# Patient Record
Sex: MALE | Race: BLACK OR AFRICAN AMERICAN | NOT HISPANIC OR LATINO | Employment: PART TIME | URBAN - METROPOLITAN AREA
[De-identification: names, ages, dates, MRNs, and addresses within clinical notes are randomized per-mention and may not be internally consistent; named-entity substitution may affect disease eponyms.]

---

## 2019-10-03 ENCOUNTER — HOSPITAL ENCOUNTER (EMERGENCY)
Facility: OTHER | Age: 44
Discharge: HOME | End: 2019-10-03
Attending: EMERGENCY MEDICINE
Payer: MEDICAID

## 2019-10-03 VITALS
TEMPERATURE: 98 F | HEIGHT: 72 IN | HEART RATE: 98 BPM | RESPIRATION RATE: 16 BRPM | OXYGEN SATURATION: 98 % | SYSTOLIC BLOOD PRESSURE: 137 MMHG | BODY MASS INDEX: 37.93 KG/M2 | WEIGHT: 280 LBS | DIASTOLIC BLOOD PRESSURE: 84 MMHG

## 2019-10-03 DIAGNOSIS — B37.49 CANDIDAL BALANOPOSTHITIS: Primary | ICD-10-CM

## 2019-10-03 LAB
BACTERIA #/AREA URNS HPF: ABNORMAL /HPF
BILIRUB UR QL STRIP: NEGATIVE
C TRACH DNA SPEC QL NAA+PROBE: NOT DETECTED
CLARITY UR: CLEAR
COLOR UR: YELLOW
GLUCOSE SERPL-MCNC: 364 MG/DL (ref 70–110)
GLUCOSE UR QL STRIP: ABNORMAL
HGB UR QL STRIP: ABNORMAL
KETONES UR QL STRIP: ABNORMAL
LEUKOCYTE ESTERASE UR QL STRIP: NEGATIVE
MICROSCOPIC COMMENT: ABNORMAL
N GONORRHOEA DNA SPEC QL NAA+PROBE: NOT DETECTED
NITRITE UR QL STRIP: NEGATIVE
PH UR STRIP: 6 [PH] (ref 5–8)
POCT GLUCOSE: 364 MG/DL (ref 70–110)
PROT UR QL STRIP: NEGATIVE
RBC #/AREA URNS HPF: 5 /HPF (ref 0–4)
SP GR UR STRIP: 1.01 (ref 1–1.03)
SQUAMOUS #/AREA URNS HPF: 6 /HPF
URN SPEC COLLECT METH UR: ABNORMAL
UROBILINOGEN UR STRIP-ACNC: NEGATIVE EU/DL
WBC #/AREA URNS HPF: 5 /HPF (ref 0–5)
WBC CLUMPS URNS QL MICRO: ABNORMAL
YEAST URNS QL MICRO: ABNORMAL

## 2019-10-03 PROCEDURE — 81000 URINALYSIS NONAUTO W/SCOPE: CPT

## 2019-10-03 PROCEDURE — 82962 GLUCOSE BLOOD TEST: CPT

## 2019-10-03 PROCEDURE — 25000003 PHARM REV CODE 250: Performed by: EMERGENCY MEDICINE

## 2019-10-03 PROCEDURE — 99283 EMERGENCY DEPT VISIT LOW MDM: CPT

## 2019-10-03 PROCEDURE — 87491 CHLMYD TRACH DNA AMP PROBE: CPT

## 2019-10-03 RX ORDER — ATORVASTATIN CALCIUM 40 MG/1
40 TABLET, FILM COATED ORAL NIGHTLY
COMMUNITY

## 2019-10-03 RX ORDER — METFORMIN HYDROCHLORIDE 500 MG/1
500 TABLET ORAL 2 TIMES DAILY WITH MEALS
COMMUNITY
End: 2019-10-03 | Stop reason: SDUPTHER

## 2019-10-03 RX ORDER — AMLODIPINE BESYLATE 10 MG/1
10 TABLET ORAL DAILY
COMMUNITY

## 2019-10-03 RX ORDER — CLOTRIMAZOLE 1 %
CREAM (GRAM) TOPICAL
Qty: 15 G | Refills: 0 | Status: SHIPPED | OUTPATIENT
Start: 2019-10-03

## 2019-10-03 RX ORDER — FLUCONAZOLE 100 MG/1
200 TABLET ORAL
Status: COMPLETED | OUTPATIENT
Start: 2019-10-03 | End: 2019-10-03

## 2019-10-03 RX ORDER — LOSARTAN POTASSIUM 100 MG/1
100 TABLET ORAL DAILY
COMMUNITY
End: 2020-08-13

## 2019-10-03 RX ORDER — METFORMIN HYDROCHLORIDE 500 MG/1
TABLET ORAL
Qty: 45 TABLET | Refills: 0 | Status: SHIPPED | OUTPATIENT
Start: 2019-10-03

## 2019-10-03 RX ADMIN — FLUCONAZOLE 200 MG: 100 TABLET ORAL at 05:10

## 2019-10-03 NOTE — ED TRIAGE NOTES
Pt to ED with CO pain, swelling, and white drainage from his foreskin. Pt states symptoms began 4 weeks ago, and have worsened over the past few day. Pt denies polyuria, and dysuria.

## 2019-10-03 NOTE — ED PROVIDER NOTES
Encounter Date: 10/3/2019    SCRIBE #1 NOTE: Praveen MARIE am scribing for, and in the presence of, Dr. Downing .       History     Chief Complaint   Patient presents with    Penis Pain     Pt CO pain, and irritation to his penis Xs 4 week.      Time seen by provider: 4:47 AM    This is a 44 y.o. male who presents with complaint of penile pain and skin irritation which has been progressively worsening for about 4 weeks.  The patient reports he was recently diagnosed with diabetes approximately 2 months ago at which time he had polyuria and polydipsia.  He was initiated on metformin regimen which he has been compliant with.  He is on 500 mg twice a day.  He has received a prescription for a glucometer but has not picked it up from the pharmacy as of yet.  The last time he checked his blood sugar was in the 200s.  The patient reports he has been compliant with diet and generally feels better since starting metformin.  He is sexually active but has not had sex in several weeks, due the symptoms.  No scrotal pain or dysuria.       The history is provided by the patient.     Review of patient's allergies indicates:  No Known Allergies  Past Medical History:   Diagnosis Date    Diabetes mellitus      History reviewed. No pertinent surgical history.  History reviewed. No pertinent family history.  Social History     Tobacco Use    Smoking status: Not on file   Substance Use Topics    Alcohol use: Not on file    Drug use: Not on file     Review of Systems   Constitutional: Negative for fever.   HENT: Negative for sore throat.    Respiratory: Negative for shortness of breath.    Cardiovascular: Negative for chest pain.   Gastrointestinal: Negative for nausea.   Endocrine: Negative for polydipsia, polyphagia and polyuria.   Genitourinary: Positive for discharge, penile pain and penile swelling. Negative for decreased urine volume, dysuria, scrotal swelling, testicular pain and urgency.   Musculoskeletal: Negative for  back pain.   Skin: Positive for rash.   Neurological: Negative for weakness.   Hematological: Does not bruise/bleed easily.   All other systems reviewed and are negative.      Physical Exam     Initial Vitals [10/03/19 0421]   BP Pulse Resp Temp SpO2   137/84 98 16 98.1 °F (36.7 °C) 98 %      MAP       --         Physical Exam    Nursing note and vitals reviewed.  Constitutional: He appears well-developed and well-nourished.   HENT:   Head: Normocephalic and atraumatic.   Eyes: Conjunctivae and EOM are normal. Pupils are equal, round, and reactive to light.   Neck: Normal range of motion. Neck supple.   Cardiovascular: Normal rate, regular rhythm, normal heart sounds and intact distal pulses. Exam reveals no gallop and no friction rub.    No murmur heard.  Pulmonary/Chest: Breath sounds normal. No stridor. No respiratory distress. He has no wheezes. He has no rhonchi. He has no rales. He exhibits no tenderness.   Abdominal: Soft. Bowel sounds are normal. He exhibits no distension. There is no tenderness. There is no rebound and no guarding.   Musculoskeletal: Normal range of motion.   Neurological: He is alert and oriented to person, place, and time. He has normal strength. No cranial nerve deficit. GCS score is 15. GCS eye subscore is 4. GCS verbal subscore is 5. GCS motor subscore is 6.   Skin: Skin is warm and dry. Capillary refill takes less than 2 seconds.   Psychiatric: He has a normal mood and affect.         ED Course   Procedures  Labs Reviewed   URINALYSIS, REFLEX TO URINE CULTURE - Abnormal; Notable for the following components:       Result Value    Glucose, UA 3+ (*)     Ketones, UA 1+ (*)     Occult Blood UA 1+ (*)     All other components within normal limits    Narrative:     Preferred Collection Type->Urine, Clean Catch   URINALYSIS MICROSCOPIC - Abnormal; Notable for the following components:    RBC, UA 5 (*)     All other components within normal limits    Narrative:     Preferred Collection  Type->Urine, Clean Catch   POCT GLUCOSE MONITORING CONTINUOUS - Abnormal; Notable for the following components:    POC Glucose 364 (*)     All other components within normal limits   POCT GLUCOSE - Abnormal; Notable for the following components:    POCT Glucose 364 (*)     All other components within normal limits   C. TRACHOMATIS/N. GONORRHOEAE BY AMP DNA    Narrative:     Sources by Resulting Lab:->[facility]          Imaging Results    None          Medical Decision Making:   History:   Old Medical Records: I decided to obtain old medical records.  Initial Assessment:   44-year-old male with apparent yeast balanitis and infection of foreskin.  Poorly controlled DM on low dose metformin regimen.  Plan tx with diflucan, nystatin   Clinical Tests:   Lab Tests: Ordered and Reviewed            Scribe Attestation:   Scribe #1: I performed the above scribed service and the documentation accurately describes the services I performed. I attest to the accuracy of the note.    Attending Attestation:           Physician Attestation for Scribe:  Physician Attestation Statement for Scribe #1: I, Dr. Downing , reviewed documentation, as scribed by Praveen Qiu in my presence, and it is both accurate and complete.                    Clinical Impression:     1. Candidal balanoposthitis                                 Kalyn Downing MD  10/07/19 3565

## 2019-10-08 ENCOUNTER — HOSPITAL ENCOUNTER (EMERGENCY)
Facility: OTHER | Age: 44
Discharge: HOME | End: 2019-10-08
Attending: EMERGENCY MEDICINE
Payer: MEDICAID

## 2019-10-08 VITALS
OXYGEN SATURATION: 98 % | HEIGHT: 72 IN | RESPIRATION RATE: 14 BRPM | WEIGHT: 275 LBS | HEART RATE: 74 BPM | BODY MASS INDEX: 37.25 KG/M2 | DIASTOLIC BLOOD PRESSURE: 67 MMHG | SYSTOLIC BLOOD PRESSURE: 117 MMHG | TEMPERATURE: 98 F

## 2019-10-08 DIAGNOSIS — R73.9 HYPERGLYCEMIA: Primary | ICD-10-CM

## 2019-10-08 DIAGNOSIS — B37.42 CANDIDAL BALANITIS: ICD-10-CM

## 2019-10-08 DIAGNOSIS — R21 RASH AND NONSPECIFIC SKIN ERUPTION: ICD-10-CM

## 2019-10-08 LAB
ALBUMIN SERPL BCP-MCNC: 4 G/DL (ref 3.5–5.2)
ALLENS TEST: ABNORMAL
ALP SERPL-CCNC: 91 U/L (ref 55–135)
ALT SERPL W/O P-5'-P-CCNC: 31 U/L (ref 10–44)
ANION GAP SERPL CALC-SCNC: 8 MMOL/L (ref 8–16)
AST SERPL-CCNC: 17 U/L (ref 10–40)
B-OH-BUTYR BLD STRIP-SCNC: 0.1 MMOL/L (ref 0–0.5)
BACTERIA #/AREA URNS HPF: ABNORMAL /HPF
BASOPHILS # BLD AUTO: 0.04 K/UL (ref 0–0.2)
BASOPHILS NFR BLD: 0.5 % (ref 0–1.9)
BILIRUB SERPL-MCNC: 0.5 MG/DL (ref 0.1–1)
BILIRUB UR QL STRIP: NEGATIVE
BUN SERPL-MCNC: 15 MG/DL (ref 6–20)
CALCIUM SERPL-MCNC: 9.6 MG/DL (ref 8.7–10.5)
CHLORIDE SERPL-SCNC: 103 MMOL/L (ref 95–110)
CLARITY UR: CLEAR
CO2 SERPL-SCNC: 27 MMOL/L (ref 23–29)
COLOR UR: YELLOW
CREAT SERPL-MCNC: 1.4 MG/DL (ref 0.5–1.4)
DELSYS: ABNORMAL
DIFFERENTIAL METHOD: ABNORMAL
EOSINOPHIL # BLD AUTO: 0.6 K/UL (ref 0–0.5)
EOSINOPHIL NFR BLD: 7.9 % (ref 0–8)
ERYTHROCYTE [DISTWIDTH] IN BLOOD BY AUTOMATED COUNT: 12.5 % (ref 11.5–14.5)
ERYTHROCYTE [SEDIMENTATION RATE] IN BLOOD BY WESTERGREN METHOD: 20 MM/H
EST. GFR  (AFRICAN AMERICAN): >60 ML/MIN/1.73 M^2
EST. GFR  (NON AFRICAN AMERICAN): 60 ML/MIN/1.73 M^2
FIO2: 21
FLOW: 0
GLUCOSE SERPL-MCNC: 356 MG/DL (ref 70–110)
GLUCOSE UR QL STRIP: ABNORMAL
HCO3 UR-SCNC: 26 MMOL/L (ref 24–28)
HCT VFR BLD AUTO: 42 % (ref 40–54)
HGB BLD-MCNC: 14.2 G/DL (ref 14–18)
HGB UR QL STRIP: ABNORMAL
IMM GRANULOCYTES # BLD AUTO: 0.02 K/UL (ref 0–0.04)
IMM GRANULOCYTES NFR BLD AUTO: 0.2 % (ref 0–0.5)
KETONES UR QL STRIP: NEGATIVE
LEUKOCYTE ESTERASE UR QL STRIP: NEGATIVE
LYMPHOCYTES # BLD AUTO: 3 K/UL (ref 1–4.8)
LYMPHOCYTES NFR BLD: 37.8 % (ref 18–48)
MCH RBC QN AUTO: 28 PG (ref 27–31)
MCHC RBC AUTO-ENTMCNC: 33.8 G/DL (ref 32–36)
MCV RBC AUTO: 83 FL (ref 82–98)
MICROSCOPIC COMMENT: ABNORMAL
MODE: ABNORMAL
MONOCYTES # BLD AUTO: 0.9 K/UL (ref 0.3–1)
MONOCYTES NFR BLD: 11.6 % (ref 4–15)
NEUTROPHILS # BLD AUTO: 3.4 K/UL (ref 1.8–7.7)
NEUTROPHILS NFR BLD: 42 % (ref 38–73)
NITRITE UR QL STRIP: NEGATIVE
NRBC BLD-RTO: 0 /100 WBC
PCO2 BLDA: 47 MMHG (ref 35–45)
PH SMN: 7.35 [PH] (ref 7.35–7.45)
PH UR STRIP: 6 [PH] (ref 5–8)
PLATELET # BLD AUTO: 226 K/UL (ref 150–350)
PMV BLD AUTO: 11.1 FL (ref 9.2–12.9)
PO2 BLDA: 34 MMHG (ref 40–60)
POC BE: 0 MMOL/L
POC SATURATED O2: 62 % (ref 95–100)
POCT GLUCOSE: 300 MG/DL (ref 70–110)
POCT GLUCOSE: 304 MG/DL (ref 70–110)
POTASSIUM SERPL-SCNC: 4 MMOL/L (ref 3.5–5.1)
PROT SERPL-MCNC: 7.2 G/DL (ref 6–8.4)
PROT UR QL STRIP: NEGATIVE
RBC # BLD AUTO: 5.08 M/UL (ref 4.6–6.2)
RBC #/AREA URNS HPF: 0 /HPF (ref 0–4)
SAMPLE: ABNORMAL
SITE: ABNORMAL
SODIUM SERPL-SCNC: 138 MMOL/L (ref 136–145)
SP GR UR STRIP: 1.02 (ref 1–1.03)
SP02: 98
SQUAMOUS #/AREA URNS HPF: 3 /HPF
URN SPEC COLLECT METH UR: ABNORMAL
UROBILINOGEN UR STRIP-ACNC: NEGATIVE EU/DL
WBC # BLD AUTO: 8.01 K/UL (ref 3.9–12.7)
WBC #/AREA URNS HPF: 3 /HPF (ref 0–5)
YEAST URNS QL MICRO: ABNORMAL

## 2019-10-08 PROCEDURE — 81000 URINALYSIS NONAUTO W/SCOPE: CPT

## 2019-10-08 PROCEDURE — 25000003 PHARM REV CODE 250: Performed by: EMERGENCY MEDICINE

## 2019-10-08 PROCEDURE — 82803 BLOOD GASES ANY COMBINATION: CPT

## 2019-10-08 PROCEDURE — 80053 COMPREHEN METABOLIC PANEL: CPT

## 2019-10-08 PROCEDURE — 85025 COMPLETE CBC W/AUTO DIFF WBC: CPT

## 2019-10-08 PROCEDURE — 63600175 PHARM REV CODE 636 W HCPCS: Performed by: EMERGENCY MEDICINE

## 2019-10-08 PROCEDURE — 96360 HYDRATION IV INFUSION INIT: CPT

## 2019-10-08 PROCEDURE — 99900035 HC TECH TIME PER 15 MIN (STAT)

## 2019-10-08 PROCEDURE — 99284 EMERGENCY DEPT VISIT MOD MDM: CPT | Mod: 25

## 2019-10-08 PROCEDURE — 82010 KETONE BODYS QUAN: CPT

## 2019-10-08 PROCEDURE — 36415 COLL VENOUS BLD VENIPUNCTURE: CPT

## 2019-10-08 RX ORDER — DIPHENHYDRAMINE HCL 25 MG
25 CAPSULE ORAL
Status: COMPLETED | OUTPATIENT
Start: 2019-10-08 | End: 2019-10-08

## 2019-10-08 RX ORDER — HYDROCORTISONE 1 %
CREAM (GRAM) TOPICAL
Qty: 30 G | Refills: 0 | Status: SHIPPED | OUTPATIENT
Start: 2019-10-08

## 2019-10-08 RX ORDER — FLUCONAZOLE 100 MG/1
200 TABLET ORAL
Status: COMPLETED | OUTPATIENT
Start: 2019-10-08 | End: 2019-10-08

## 2019-10-08 RX ADMIN — DIPHENHYDRAMINE HYDROCHLORIDE 25 MG: 25 CAPSULE ORAL at 05:10

## 2019-10-08 RX ADMIN — FLUCONAZOLE 200 MG: 100 TABLET ORAL at 05:10

## 2019-10-08 RX ADMIN — SODIUM CHLORIDE 1000 ML: 0.9 INJECTION, SOLUTION INTRAVENOUS at 05:10

## 2019-10-08 NOTE — ED PROVIDER NOTES
Encounter Date: 10/8/2019    SCRIBE #1 NOTE: I, Tiara Clayton, am scribing for, and in the presence of, Dr. Wu.       History     Chief Complaint   Patient presents with    Rash     Pt to ED with CO rash to arms and back, and continued pain and irritation to foreskin.     Time seen by provider: 5:25 AM    This is a 44 y.o. male who presents with chief complaint of a puritic rash on his penis.  Onset several weeks ago.  Patient was here about 1 week ago for evaluation of this.  Was diagnosed with candidal balanitis.  He reports compliance with prescribed chloride which resolved appointment, but states that irritation, pain, burning with urination, skin cracking, and exudate persist.  He denies any associated fevers, or other general lesions.  Patient also complains of generalized pruritic rash diffusely over the last several days. He mentions that he has always had skin problems, is unsure of the diagnosis.  In the past few days, he noticed red itchy bumps on his back and arms. He denies taking any benadryl or other treatment.  He denies any new detergents, soaps, clothes, of there obvious environmental exposures apart from having a cousin stay in his house.    The history is provided by the patient and medical records.     Review of patient's allergies indicates:  No Known Allergies  Past Medical History:   Diagnosis Date    Diabetes mellitus     Hypertension      History reviewed. No pertinent surgical history.  History reviewed. No pertinent family history.  Social History     Tobacco Use    Smoking status: Current Every Day Smoker     Packs/day: 0.50     Types: Cigarettes    Smokeless tobacco: Never Used   Substance Use Topics    Alcohol use: Not on file    Drug use: Yes     Types: Marijuana       ROS: As per HPI and below:   General: No fever.   HENT: No facial pain. No or lesions.  Eyes: Negative for eye pain.   Cardiovascular: No chest pain.   Respiratory:  No dyspnea.   GI: No abdominal pain. No nausea.  No vomiting. No diarrhea.   : No hematuria.  Foreskin edema, exudate, dysuria, erythema.  Skin:  Pruritic rash   Neuro:  No syncope.  No focal deficits.   Musculoskeletal: No extremity pain.  All other systems reviewed and are negative.    Physical Exam     Initial Vitals [10/08/19 0520]   BP Pulse Resp Temp SpO2   (!) 136/94 87 18 98 °F (36.7 °C) 99 %      MAP       --         Nursing note and vitals reviewed.  BP (!) 136/94 (BP Location: Left arm, Patient Position: Sitting)   Pulse 87   Temp 98 °F (36.7 °C) (Oral)   Resp 18   Ht 6' (1.829 m)   Wt 124.7 kg (275 lb)   SpO2 99%   BMI 37.30 kg/m²   Constitutional: AAOx3. No distress. Obese.  Eyes: EOMI. No discharge. Anicteric.  HENT:   Mouth/Throat: Oropharynx is clear. Uvula midline. Mucus membranes moist.  Neck: Normal range of motion. Neck supple.  Cardiovascular: Normal rate. No murmur, no gallop and no friction rub heard.   Pulmonary/Chest: No respiratory distress. Effort normal. No wheezes, no rales, no rhonchi.   Abdominal: Bowel sounds normal. Soft. No distension and no mass. There is no tenderness. There is no rebound, no guarding, no tenderness at McBurney's point.  : Penile and foreskin edema, irritation, erythema and clear exudate. No scrotal or other  edema, erythema, crepitus   Musculoskeletal: Normal range of motion.   Neurological: GCS 15. Alert and oriented to person, place, and time. No gross cranial nerve, light touch or strength deficit. Coordination normal.   Skin: Skin is warm and dry. Fine scattered diffuse maculopapular rash with patches of excoriations. None with active drainage or surrounding erythema.    Psychiatric: Behavior is normal. Judgment normal.      ED Course   Procedures  Labs Reviewed   URINALYSIS, REFLEX TO URINE CULTURE   POCT GLUCOSE, HAND-HELD DEVICE          Imaging Results    None          Medical Decision Making:   History:   Old Medical Records: I decided to obtain old medical records.  Independently  "Interpreted Test(s):   I have ordered and independently interpreted X-rays - see prior notes.  Clinical Tests:   Lab Tests: Ordered and Reviewed  Radiological Study: Ordered and Reviewed            Scribe Attestation:   Scribe #1: I performed the above scribed service and the documentation accurately describes the services I performed. I attest to the accuracy of the note.    Attending Attestation:           Physician Attestation for Scribe:  Physician Attestation Statement for Scribe #1: I, Dr. Wu, reviewed documentation, as scribed by Tiara Clayton  in my presence, and it is both accurate and complete.                 ED Course as of Oct 08 2103   Tue Oct 08, 2019   0544 Patient is a 44-year-old male with obesity, recent diagnosis of diabetes on metformin (after some initial difficulty now has a glucometer but does not know how to use it), "bad skin" which he thinks is perhaps eczema, prior visit for balanitis (treated with fluconazole dose and topical clotrimazole) who presents with continued pain on a pain, irritation, foreskin edema, as well as diffuse pruritic maculopapular rash.  On exam patient has edema, erythema, foreskin skin changes consistent with candidal balanitis.  Physical exam not consistent with Jevon's gangrene/necrotizing fasciitis.  Initial differential also included DKA, hyperglycemia.  Will repeat patient's dose of fluconazole, likely start him on topical steroid for his balanitis with urology follow-up.  Will also rule out DKA.    [RC]   0614 I independently reviewed and interpreted CXR which shows no pneumothorax, no focal consolidation, no cardiomegaly, no acute process.      I discussed the patient history, findings, plan with Dr. Castañeda, who assumes care.        [RC]   0635 Patient's glucose was elevated, but not in DKA as his pH is 7.3, serum acetone was negative and no ketones in his urine.  Educated the patient on how to improve his glucose at home through hydration MI being " compliant with his medication.  Patient is a follow-up with his PCP in 2 days.  Nurse is at the bedside educating him on how to use his medications and his strips. Because patient has not had improvement of his balanitis on antifungal therapy, will attempt a trial of hydrocortisone 1%    [MA]      ED Course User Index  [MA] Santosh Castañeda MD  [RC] Maximo Wu MD     Clinical Impression:     1. Hyperglycemia    2. Candidal balanitis    3. Rash and nonspecific skin eruption                                 Maximo Wu MD  10/08/19 0627       Maximo Wu MD  10/08/19 3715

## 2020-05-19 ENCOUNTER — HOSPITAL ENCOUNTER (EMERGENCY)
Facility: OTHER | Age: 45
Discharge: HOME | End: 2020-05-20
Attending: EMERGENCY MEDICINE
Payer: MEDICAID

## 2020-05-19 VITALS
SYSTOLIC BLOOD PRESSURE: 138 MMHG | RESPIRATION RATE: 18 BRPM | HEART RATE: 92 BPM | HEIGHT: 72 IN | TEMPERATURE: 98 F | WEIGHT: 280 LBS | BODY MASS INDEX: 37.93 KG/M2 | OXYGEN SATURATION: 97 % | DIASTOLIC BLOOD PRESSURE: 92 MMHG

## 2020-05-19 DIAGNOSIS — B34.9 VIRAL ILLNESS: Primary | ICD-10-CM

## 2020-05-19 PROCEDURE — U0002 COVID-19 LAB TEST NON-CDC: HCPCS

## 2020-05-19 PROCEDURE — 99284 EMERGENCY DEPT VISIT MOD MDM: CPT

## 2020-05-20 LAB — SARS-COV-2 RDRP RESP QL NAA+PROBE: NEGATIVE

## 2020-05-20 NOTE — DISCHARGE INSTRUCTIONS
"Consider this a Doctor's Order and Doctor's Excuse Note.   Your symptoms may be due to COVID19.   Self quarantine for 14 days. Do not go to work or school.   Staying away from others will save lives.     You must behave like you have it to protect others from getting it. You may still be contagious for days before and after your symptoms go away.     If you must be near other people, wear a facemask, stay 6 feet away, limit contact, and avoid touching the same surfaces.     Stay home from work, school, and away from other public places.     Get rest and stay hydrated. Take tylenol (acetaminophen) for aches, pains, and fever. Take decongestants for congestion.     Wash your hands often, scrubbing with soap and water for at least 20 seconds or clean your hands with an alcohol-based hand  that contains at least 60% alcohol. Make sure you are covering all surfaces of your hands and rubbing them together until they feel dry. Youtube "WHO hand washing."    Cover your cough and sneezes.  Cover your mouth and nose with a tissue when you cough or sneeze. Throw used tissues in a lined trash can; then immediately wash your hands.     Avoid touching your eyes, nose, and mouth with unwashed hands.    As much as possible, stay in a specific room and away from other people in your home. Also, you should use a separate bathroom, if available.     If you need to be around other people in or outside of the home, wear a facemask.    Avoid sharing personal items with other people in your household, like dishes, towels, and bedding.     Clean all surfaces that are touched often, like counters, tabletops, and doorknobs. Use household cleaning sprays or wipes according to the label instructions.    Do not handle pets or other animals while sick.    The decision to stop home isolation is made on a case by case basis in consultation with healthcare providers, and public health departments.     Check www.cdc.gov/coronavirus for " latest official information    Monitor your symptoms carefully. If your symptoms get worse, call your healthcare provider immediately ([address], [facility] Hotline [phone]).    Continue isolation until:  At least 3 days (72 hours) have passed since:   no more fever and no use of fever reducing medicine, and improvement in any respiratory symptoms (cough, shortness of breath, etc.)  AND at least 10 days have passed since symptoms first started.

## 2020-05-20 NOTE — ED PROVIDER NOTES
Encounter Date: 5/19/2020       History     Chief Complaint   Patient presents with    Sore Throat     pt reports sore throat, headache, nasal congestion with onset yesterday, repotrs taking theraflu with no relief of symptoms     45 y/o male with HTN and DM here with c/o sore throat, cough, sinus pressure and fatigue for approx. 24 hours.  He reports taking theraflu and kathrin selzer at home which relieved his cough.  He denies any known sick contacts.  He denies any fever, myalgias, N/V, diarrhea and dyspnea.  Pt stayed home from work today and was told by his boss to come in and get checked out.  He also states he needs a note stating he is cleared to go back to work.          Review of patient's allergies indicates:  No Known Allergies  Past Medical History:   Diagnosis Date    Diabetes mellitus     Hypertension      No past surgical history on file.  No family history on file.  Social History     Tobacco Use    Smoking status: Current Every Day Smoker     Packs/day: 0.50     Types: Cigarettes    Smokeless tobacco: Never Used   Substance Use Topics    Alcohol use: Not on file    Drug use: Yes     Types: Marijuana     Review of Systems   Constitutional: Positive for fatigue. Negative for chills and fever.   HENT: Positive for sinus pressure and sore throat. Negative for ear pain, trouble swallowing and voice change.    Respiratory: Positive for cough. Negative for chest tightness, shortness of breath and wheezing.    Gastrointestinal: Negative for diarrhea, nausea and vomiting.   Genitourinary: Negative for dysuria and frequency.   Musculoskeletal: Negative for myalgias.   Skin: Negative for rash.   Neurological: Negative for weakness and headaches.       Physical Exam     Initial Vitals [05/19/20 2336]   BP Pulse Resp Temp SpO2   (!) 138/92 92 18 98.4 °F (36.9 °C) 97 %      MAP       --         Physical Exam    Nursing note and vitals reviewed.  Constitutional: He appears well-developed and well-nourished.  He is not diaphoretic. No distress.   HENT:   Head: Normocephalic and atraumatic.   Right Ear: External ear normal.   Left Ear: External ear normal.   Nose: Nose normal.   Mouth/Throat: Oropharynx is clear and moist.   Eyes: Conjunctivae and EOM are normal. Right eye exhibits no discharge. Left eye exhibits no discharge. No scleral icterus.   Neck: Normal range of motion. Neck supple. No JVD present.   Cardiovascular: Normal rate, regular rhythm and normal heart sounds.   Pulmonary/Chest: Breath sounds normal. No stridor. No respiratory distress. He has no wheezes. He has no rhonchi. He has no rales.   Musculoskeletal: Normal range of motion.   Neurological: He is alert and oriented to person, place, and time. He has normal strength. GCS score is 15. GCS eye subscore is 4. GCS verbal subscore is 5. GCS motor subscore is 6.   Psychiatric: He has a normal mood and affect. His behavior is normal. Judgment and thought content normal.         ED Course   Procedures  Labs Reviewed   SARS-COV-2 RNA AMPLIFICATION, QUAL          Imaging Results    None             Additional MDM:   Comments: Pt well-appearing and in no apparent respiratory distress.  HEENT and lung exam unremarkable.  Covid swab obtained.      12:25 AM  COVID swab is negative today.  However, given the patient's symptoms and still concerned that this may be the cause of his symptoms.  Therefore he has been instructed to isolated home for the next 14 days.  Patient was given indications for seeking immediate re-evaluation was otherwise instructed to follow up with his primary care doctor for re-evaluation and clearance to return to work..                               Clinical Impression:     1. Viral illness                ED Disposition Condition    Discharge Stable        ED Prescriptions     None        Follow-up Information     Follow up With Specialties Details Why Contact Info    Your primary care doctor  Schedule an appointment as soon as possible for  a visit  for re-evaluation and medical clearance to return to work                                      Nicole Batres MD  05/20/20 0026

## 2020-08-12 ENCOUNTER — HOSPITAL ENCOUNTER (EMERGENCY)
Facility: OTHER | Age: 45
Discharge: HOME | End: 2020-08-13
Attending: EMERGENCY MEDICINE
Payer: MEDICAID

## 2020-08-12 DIAGNOSIS — M54.50 ACUTE BILATERAL LOW BACK PAIN WITHOUT SCIATICA: ICD-10-CM

## 2020-08-12 DIAGNOSIS — R73.9 HYPERGLYCEMIA: Primary | ICD-10-CM

## 2020-08-12 LAB — POCT GLUCOSE: 243 MG/DL (ref 70–110)

## 2020-08-12 PROCEDURE — 99284 EMERGENCY DEPT VISIT MOD MDM: CPT | Mod: 25

## 2020-08-12 PROCEDURE — 82962 GLUCOSE BLOOD TEST: CPT

## 2020-08-13 VITALS
TEMPERATURE: 98 F | HEART RATE: 75 BPM | SYSTOLIC BLOOD PRESSURE: 122 MMHG | BODY MASS INDEX: 34.54 KG/M2 | WEIGHT: 255 LBS | DIASTOLIC BLOOD PRESSURE: 72 MMHG | HEIGHT: 72 IN | RESPIRATION RATE: 18 BRPM | OXYGEN SATURATION: 98 %

## 2020-08-13 LAB
ALLENS TEST: ABNORMAL
DELSYS: ABNORMAL
ERYTHROCYTE [SEDIMENTATION RATE] IN BLOOD BY WESTERGREN METHOD: 16 MM/H
FIO2: 21
FLOW: 0
HCO3 UR-SCNC: 31.3 MMOL/L (ref 24–28)
MODE: ABNORMAL
PCO2 BLDA: 53.9 MMHG (ref 35–45)
PH SMN: 7.37 [PH] (ref 7.35–7.45)
PO2 BLDA: 21 MMHG (ref 40–60)
POC BE: 6 MMOL/L
POC SATURATED O2: 31 % (ref 95–100)
SAMPLE: ABNORMAL
SITE: ABNORMAL
SP02: 94

## 2020-08-13 PROCEDURE — 25000003 PHARM REV CODE 250: Performed by: EMERGENCY MEDICINE

## 2020-08-13 PROCEDURE — 82803 BLOOD GASES ANY COMBINATION: CPT

## 2020-08-13 PROCEDURE — 99900035 HC TECH TIME PER 15 MIN (STAT)

## 2020-08-13 RX ORDER — METHOCARBAMOL 750 MG/1
1500 TABLET, FILM COATED ORAL 3 TIMES DAILY PRN
Qty: 30 TABLET | Refills: 0 | Status: SHIPPED | OUTPATIENT
Start: 2020-08-13 | End: 2020-08-18

## 2020-08-13 RX ORDER — MELOXICAM 7.5 MG/1
7.5 TABLET ORAL
Status: COMPLETED | OUTPATIENT
Start: 2020-08-13 | End: 2020-08-13

## 2020-08-13 RX ORDER — DICLOFENAC SODIUM 10 MG/G
2 GEL TOPICAL 3 TIMES DAILY PRN
Qty: 100 G | Refills: 0 | Status: SHIPPED | OUTPATIENT
Start: 2020-08-13

## 2020-08-13 RX ORDER — METHOCARBAMOL 750 MG/1
1500 TABLET, FILM COATED ORAL
Status: COMPLETED | OUTPATIENT
Start: 2020-08-13 | End: 2020-08-13

## 2020-08-13 RX ORDER — NAPROXEN 500 MG/1
500 TABLET ORAL 2 TIMES DAILY PRN
Qty: 60 TABLET | Refills: 0 | OUTPATIENT
Start: 2020-08-13 | End: 2022-04-10

## 2020-08-13 RX ORDER — LIDOCAINE 50 MG/G
PATCH TOPICAL
Qty: 30 PATCH | Refills: 0 | Status: SHIPPED | OUTPATIENT
Start: 2020-08-13

## 2020-08-13 RX ADMIN — MELOXICAM 7.5 MG: 7.5 TABLET ORAL at 12:08

## 2020-08-13 RX ADMIN — METHOCARBAMOL 1500 MG: 750 TABLET ORAL at 12:08

## 2020-08-13 NOTE — ED TRIAGE NOTES
"Mo Orozco, an 44 y.o. male presents to the ED with blurred vision and lower back pain. Pt states he thinks it is harder to see using her glasses.      Review of patient's allergies indicates:  No Known Allergies  Chief Complaint   Patient presents with    Blurred Vision     x4 days- reports "My sugar was high and I got it down to the 180s and it still wasn't right"    Back Pain     chronic lower back- worse x1 week. Taking IBU without relief     Past Medical History:   Diagnosis Date    Diabetes mellitus     Hypertension        Patient identifiers verified and correct.     LOC: The patient is awake, alert and aware of environment with an appropriate affect, the patient is oriented x 3 and speaking appropriately.     APPEARANCE: Patient appears comfortable and in no acute distress, patient is clean and well groomed.    HEENT: Head symmetrical. Eyes bilateral.  Bilateral ears without drainage. Bilateral nares patent, throat clear.    SKIN: The skin is warm and dry, color consistent with ethnicity, patient has normal skin turgor and moist mucus membranes, skin intact, no breakdown or bruising noted.     MUSCULOSKELETAL: Patient moving all extremities spontaneously, no swelling noted.    RESPIRATORY: Airway is open and patent, respirations are spontaneous, patient has a normal effort and rate, no accessory muscle use noted.     CARDIAC: Patient has a normal rate and regular rhythm, no edema noted, capillary refill < 3 seconds.     GASTRO: Abdomen soft and non-distended.    NEURO: Pt opens eyes spontaneously pupils equal, round, and reactive. behavior appropriate to situation, follows commands, facial expression symmetrical,    NEUROVASCULAR: All extremities are warm and pink.       Will continue to monitor.    "

## 2020-08-13 NOTE — DISCHARGE INSTRUCTIONS
Call your primary care doctor to make the first available appointment.     Keep all your medical appointments.     Take your regular medication as prescribed. Contact your primary care provider before running out of medication, or for any problems obtaining them.    Do not drive or operate heavy machinery while taking opioid or muscle relaxing medications. Examples include norco, percocet, xanax, valium, flexeril.     Overuse or long term use of pain and sedating medication may lead to addiction, dependence, organ failure, family and work problems, legal problems, accidental overdose and death.    If you do not have health insurance, you probably can afford it:  Call 1-383.876.1245 ([facility] hotline) or go to www.[place].gov    Your evaluation in the ED does not suggest any emergent or life threatening medical condition requiring admission or immediate intervention beyond that provided in the ED.   However, the signs of a serious problem sometimes take more time to appear.   RETURN TO THE ER if any of the following occur:    Weakness, dizziness, fainting, or loss of consciousness   Fever of 100.4ºF (38ºC) or higher  Any worse symptoms  Any new or concerning symptoms    To protect yourself and others from COVID19:  Minimize trips outside of home.  Wear a mask whenever outside your home.   Wear a mask whenever indoors with people you do not live with.  Stay at least 6 feet from others you do not live with (inside or outside).  Wash hands frequently for at least 20 seconds at a time.  Avoid crowds or crowded places.  Quarantine for 14 days if you are exposed to someone with cold, flu, or COVID19 symptoms.   Even if you or they had a negative COVID19 test   Even if you have no symptoms   Quarantine for 14 days if you have cold, flu, or COVID19 symptoms (fever, cough, sore throat, breathing troubles, loss of taste/smell, stomach symptoms, etc.)  Disinfect surfaces that are touched a lot (includes your phone, handles,  switches, etc)      Free COVID19 testing:   No cost. No health insurance required. Walk-up.   [facility] Mobile Testing: Anyone is eligible. No ID required. Most days. Hours and locations vary. Call 211 or go to ready.karly.gov.   [place] [facility]: Must have symptoms (fever, cough, sore throat, breathing troubles, loss of taste/smell, stomach symptoms, etc.). [address], [phone], [email or web address]. Mon-Fri 830am-4pm. Sat 830am-11am.     Food Assistance:  [place] [facility] COVID-19 Meal Assistance Program: The [facility] will provide meal assistance to any resident 65 or older, adults with high-risk conditions, homeless residents, people who test positive for COVID-19, people recently exposed to COVID19, and children under 18. Call 311 (488-378-5513) or go to karly.gov/311    [employer] [place]:   [place] Hunger Hotline ([place]): text your zip code to receive a list of food pantries near you: 864.859.3129  [employer] [place]: Saturdays 9am-12pm at [address].  No ID or paperwork required.  [employer]: Locations vary weekly. Call 211 for information about second harvest or visit www.[place].karly.gov. No ID or paperwork required. Your windows do not need to be opened. [employer] may be present.     Food Delivery:  [employer] ([place]):  Delivering on Wednesdays.  Call 244-412-8481 or text 875-296-3921  [employer]: ([place]): Delivering groceries and supplies to immigrant families Monday through Friday 9am-4pm. [phone].  [place] ([place]): Delivering groceries for those 60+ and/or immunocompromised. 811.826.8646 or mireyamutualaid@EverTrue.IntelliBatt    Legal Services and Rent Assistance:  [facility] Legal Service: Free legal services for low income individuals. Includes problems with rent or eviction. 339.990.2276  [facility] [employer]: For problems with rent, eviction. Call/text 828-100-8640, or email cain@jpnsi.org  [facility]  Justice Center: Guidance for those experiencing family violence, child abuse, and sexual assault. 463.530.7628    Unemployment:  Those who have lost all or some of their work are eligible for state unemployment ($247 weekly before tax). They may also be eligible for federal Pandemic Unemployment Assistance ($600 weekly before tax). This includes independent contractors, gig workers, and those unemployed pre-COVID.   [place] Unemployment Hotline: Mon-Fri 830am-430pm. 455.556.7620, 907.462.5407, and 812-406-1601. Due to long phone hold times, we recommend applying online at www.[employer]. If you need help with internet access for the application, call 971-327-0124.     You were seen for your back pain.  At this time, it does not appear your pain is from a dangerous cause.     You have injured the muscles (strain) or ligaments (sprain) around the spine. Muscle spasm is often present and adds to the pain.     Do your activities as tolerated. Bedrest will probably make your back pain worse.  Take NSAIDs regularly over the next 1-2 days. Do not exceed the maximum recommended daily dose.     Take all your medications exactly as prescribed.  Call your primary care provider to make the first available appointment.     A back sprain or muscle strain usually gets better in 2-3 weeks. If pain continues and does not respond to medical treatment after 3-4 weeks contact your primary care doctor or return to the ER.     Do not drive or operate heavy machinery while taking valium, lortab, percocet or other sedating medications. Prolonged or overuse of drugs prescribed for pain, sedation or muscle relaxation may lead to addiction, dependence, family problems, legal problems, organ failure, death.      RETURN TO THE ER if any of the following occur:    Pain becomes worse or spreads into your arms or legs   Weakness, numbness or pain in one or both arms or legs   Loss of bowel or bladder control   Numbness in the groin area    Difficulty walking  New or worse pain: if it feels different, becomes more severe, lasts longer, or begins to spread into your shoulder, arm, neck, jaw or back   Shortness of breath or increased pain with breathing   Cough with dark colored sputum (phlegm) or blood   Weakness, dizziness, fainting, falling out, or loss of consciousness   Fever of 100.4ºF (38ºC) or higher  Any new or concerning symptoms

## 2020-08-13 NOTE — ED PROVIDER NOTES
"Encounter Date: 8/12/2020    SCRIBE #1 NOTE: I, Mao Ceballosial, am scribing for, and in the presence of, Dr. Wu.       History     Chief Complaint   Patient presents with    Blurred Vision     x4 days- reports "My sugar was high and I got it down to the 180s and it still wasn't right"    Back Pain     chronic lower back- worse x1 week. Taking IBU without relief     Time seen by provider: 12:27 AM    This is a 44 y.o. male who presents with complaint of blurred vision that began one week ago. The patient notes that for approximately two weeks his blood sugar was in the "300's" and he was experiencing mild polyuria and polydipsia which has since resolved. He was able to get his blood sugar back down by changing his diet admitting that he started eating sweets, fried food, and otherwise deviating from diabetic diet in the past few weeks. He became more strict about his diet, and noted yesterday his blood sugar was 180. He has prescription glasses that he got two years ago. He describes that when he uses his glasses, his vision is blurry unless he puts them several inches in front of his face. He notes eye discomfort he attributes to straining. He denies flashes, floaters, eye redness, or discharge.     He is also experiencing exacerbated chronic lower back pain that began one week ago while having intercourse. He states this pain has been an intermittent problem his whole life, but this episode is worse than most, and never lasted this long. Not improved with regular acetaminophen, then ibuprofen, then naproxen. Pain is worse with certain movements and positions, particularly hip flexion / extension. He denies any trauma, falls, fevers, or focal deficits.      The history is provided by the patient and medical records.     Review of patient's allergies indicates:  No Known Allergies  Past Medical History:   Diagnosis Date    Diabetes mellitus     Hypertension      Past Surgical History:   Procedure Laterality " Date    HERNIA REPAIR       History reviewed. No pertinent family history.  Social History     Tobacco Use    Smoking status: Current Every Day Smoker     Packs/day: 0.50     Types: Cigarettes    Smokeless tobacco: Never Used   Substance Use Topics    Alcohol use: Yes    Drug use: Yes     Types: Marijuana     ROS: As per HPI and below:   General: No fever.   HENT: No facial pain.   Eyes: Notes blurred vision and eye strain. No facial pain. No rhinorrhea.   Cardiovascular: No chest pain.   Respiratory:  No dyspnea. No cough.   GI: No abdominal pain. No nausea. No vomiting. No diarrhea.   Skin: No rashes.   Neuro: No syncope. No focal deficits.   Musculoskeletal: Notes lower back pain. No extremity pain.  All other systems reviewed and are negative.    Physical Exam     Initial Vitals [08/12/20 2347]   BP Pulse Resp Temp SpO2   119/71 83 18 98.2 °F (36.8 °C) 97 %      MAP       --         Nursing note and vitals reviewed.  Constitutional: AAOx3. Well-developed and well-nourished. Uncomfortable. Obese.   HENT:   Mouth/Throat: Oropharynx is clear and moist.  Eyes: Pupils are equal, round, and reactive to light. No discharge. Anicteric. Mild injection bilaterally.     Corrected visual acuity:  L: 20/40  R: 20/50     Neck: Normal range of motion. Neck supple. No midline spinal tenderness.  Cardiovascular: Normal rate.  Pulmonary/Chest: Effort normal.  Abdominal: Soft. Bowel sounds normal. No distension and no mass. There is no tenderness. There is no rebound, no guarding.  Musculoskeletal: Normal range of motion. No midline spinal tenderness. No stepoffs or deformities. Sacral paraspinal tenderness and spasm bilaterally.   Neurological: Alert and oriented to person, place, and time. No gross cranial nerve deficit. Coordination normal. No UE/LE light  touch or strength deficits. Able to do deep knee bend, stand on heels and toes. Normal gait.   Skin: Skin is warm and dry.   Ext: 2+ radial pulses  Psychiatric: Behavior  is normal. Judgment normal.    ED Course   Procedures  Labs Reviewed   POCT GLUCOSE - Abnormal; Notable for the following components:       Result Value    POCT Glucose 243 (*)     All other components within normal limits   ISTAT PROCEDURE - Abnormal; Notable for the following components:    POC PCO2 53.9 (*)     POC PO2 21 (*)     POC HCO3 31.3 (*)     POC SATURATED O2 31 (*)     All other components within normal limits          Imaging Results    None          Medical Decision Making:   History:   Old Medical Records: I decided to obtain old medical records.  Clinical Tests:   Lab Tests: Ordered and Reviewed            Scribe Attestation:   Scribe #1: I performed the above scribed service and the documentation accurately describes the services I performed. I attest to the accuracy of the note.    Attending Attestation:           Physician Attestation for Scribe:  Physician Attestation Statement for Scribe #1: I, Dr. Wu, reviewed documentation, as scribed by Mao Reyna in my presence, and it is both accurate and complete.         Attending ED Notes:   Patient is a 44-year-old male with diabetes, chronic intermittent back pain who presents with blurry vision when using his glasses the last week in the setting of hyperglycemia associated with polyuria and polydipsia last week which has since improved including the resolution of his polyuria and polydipsia.    Initial differential included DKA, hyperglycemia.  I independently reviewed and interpreted labs VBG, which is not consistent with DKA.  My initial differential diagnosis for his back pain included ruptured AAA, epidural abscess, cauda equina syndrome, fracture, disc herniation, sciatica, and musculoskeletal back pain.     Exam with no stepoff/deformity to lower lumbar spine, neuro intact, no abdominal mass, gait intact, no signs of infection and appears nontoxic.  At present no evidence of cauda equina, acute neurologic compromise, or AAA. By history  and physical exam, I believe the patient has muscle spasm with lumbar strain.  I will provide anti-inflammatories and muscle relaxants.     I discussed with patient and/or guardian/caretaker that this evaluation in the ED does not suggest any emergent or life threatening medical condition requiring admission or immediate intervention beyond what was provided in the ED. Regardless, an unremarkable evaluation in the ED does not preclude the development or presence of a serious or life threatening condition. As such, patient was instructed to seek medical assistance for any worsening or change in current symptoms.     I had a detailed discussion with patient  and/or guardian/caretaker regarding findings, plan, return precautions, importance of medication adherence, need to follow-up with a PCP. All questions answered.     Management decisions for this encounter made amidst early acute phase of COVID19 public health emergency; emergency medicine workup standards and admission vs. discharge standards have necessarily shifted.     Note was created using voice recognition software. It may have occasional typographical errors not identified and edited despite initial review prior to signing.                          Clinical Impression:     1. Hyperglycemia    2. Acute bilateral low back pain without sciatica              ED Disposition Condition    Discharge Good        ED Prescriptions     Medication Sig Dispense Start Date End Date Auth. Provider    naproxen (NAPROSYN) 500 MG tablet Take 1 tablet (500 mg total) by mouth 2 (two) times daily as needed (pain). 60 tablet 8/13/2020  Maximo Wu MD    diclofenac sodium (VOLTAREN) 1 % Gel Apply 2 g topically 3 (three) times daily as needed. Apply 2 grams to affected area 3 times daily as needed 100 g 8/13/2020  Maximo Wu MD    lidocaine (LIDODERM) 5 % Apply to affected area as needed for pain for 12 hours, then off for 12 hours. Discard after each use.  May use 4%  lidocaine patch as alternative. 30 patch 8/13/2020  Maximo Wu MD    methocarbamoL (ROBAXIN) 750 MG Tab Take 2 tablets (1,500 mg total) by mouth 3 (three) times daily as needed (Muscle spasm pain). 30 tablet 8/13/2020 8/18/2020 Maximo Wu MD        Follow-up Information     Follow up With Specialties Details Why Contact Info Additional Information    [facility] - [facility] Ophthalmology Schedule an appointment as soon as possible for a visit  For recheck with specialist [facility]  [facility] [id number] [phone] The Optometry department is located on the 10th floor. If you are seeing Dr. Merino, her clinic is located in the [facility] on the 1st floor.                                     Maximo Wu MD  08/13/20 0225       Maximo Wu MD  08/13/20 0228

## 2021-04-19 ENCOUNTER — HOSPITAL ENCOUNTER (EMERGENCY)
Facility: OTHER | Age: 46
Discharge: HOME | End: 2021-04-19
Attending: EMERGENCY MEDICINE
Payer: MEDICAID

## 2021-04-19 VITALS
HEIGHT: 72 IN | SYSTOLIC BLOOD PRESSURE: 128 MMHG | TEMPERATURE: 98 F | BODY MASS INDEX: 39.28 KG/M2 | WEIGHT: 290 LBS | HEART RATE: 79 BPM | OXYGEN SATURATION: 99 % | DIASTOLIC BLOOD PRESSURE: 66 MMHG | RESPIRATION RATE: 17 BRPM

## 2021-04-19 DIAGNOSIS — R73.9 HYPERGLYCEMIA: Primary | ICD-10-CM

## 2021-04-19 DIAGNOSIS — B37.49 CANDIDAL BALANOPOSTHITIS: ICD-10-CM

## 2021-04-19 DIAGNOSIS — R73.09 POOR GLYCEMIC CONTROL: ICD-10-CM

## 2021-04-19 LAB
ALBUMIN SERPL BCP-MCNC: 3.7 G/DL (ref 3.5–5.2)
ALP SERPL-CCNC: 107 U/L (ref 55–135)
ALT SERPL W/O P-5'-P-CCNC: 23 U/L (ref 10–44)
ANION GAP SERPL CALC-SCNC: 11 MMOL/L (ref 8–16)
AST SERPL-CCNC: 15 U/L (ref 10–40)
B-OH-BUTYR BLD STRIP-SCNC: 0.3 MMOL/L (ref 0–0.5)
BACTERIA #/AREA URNS HPF: NORMAL /HPF
BASOPHILS # BLD AUTO: 0.04 K/UL (ref 0–0.2)
BASOPHILS NFR BLD: 0.5 % (ref 0–1.9)
BILIRUB SERPL-MCNC: 0.5 MG/DL (ref 0.1–1)
BILIRUB UR QL STRIP: NEGATIVE
BUN SERPL-MCNC: 19 MG/DL (ref 6–20)
CALCIUM SERPL-MCNC: 9.4 MG/DL (ref 8.7–10.5)
CHLORIDE SERPL-SCNC: 100 MMOL/L (ref 95–110)
CLARITY UR: CLEAR
CO2 SERPL-SCNC: 22 MMOL/L (ref 23–29)
COLOR UR: YELLOW
CREAT SERPL-MCNC: 1.3 MG/DL (ref 0.5–1.4)
DIFFERENTIAL METHOD: ABNORMAL
EOSINOPHIL # BLD AUTO: 0.6 K/UL (ref 0–0.5)
EOSINOPHIL NFR BLD: 7.1 % (ref 0–8)
ERYTHROCYTE [DISTWIDTH] IN BLOOD BY AUTOMATED COUNT: 12.5 % (ref 11.5–14.5)
EST. GFR  (AFRICAN AMERICAN): >60 ML/MIN/1.73 M^2
EST. GFR  (NON AFRICAN AMERICAN): >60 ML/MIN/1.73 M^2
GLUCOSE SERPL-MCNC: 428 MG/DL (ref 70–110)
GLUCOSE UR QL STRIP: ABNORMAL
HCT VFR BLD AUTO: 45.4 % (ref 40–54)
HCV AB SERPL QL IA: NEGATIVE
HGB BLD-MCNC: 15.4 G/DL (ref 14–18)
HGB UR QL STRIP: ABNORMAL
HIV 1+2 AB+HIV1 P24 AG SERPL QL IA: NEGATIVE
IMM GRANULOCYTES # BLD AUTO: 0.02 K/UL (ref 0–0.04)
IMM GRANULOCYTES NFR BLD AUTO: 0.2 % (ref 0–0.5)
KETONES UR QL STRIP: ABNORMAL
LEUKOCYTE ESTERASE UR QL STRIP: NEGATIVE
LYMPHOCYTES # BLD AUTO: 2.2 K/UL (ref 1–4.8)
LYMPHOCYTES NFR BLD: 26.8 % (ref 18–48)
MCH RBC QN AUTO: 27.9 PG (ref 27–31)
MCHC RBC AUTO-ENTMCNC: 33.9 G/DL (ref 32–36)
MCV RBC AUTO: 82 FL (ref 82–98)
MICROSCOPIC COMMENT: NORMAL
MONOCYTES # BLD AUTO: 0.9 K/UL (ref 0.3–1)
MONOCYTES NFR BLD: 10.3 % (ref 4–15)
NEUTROPHILS # BLD AUTO: 4.6 K/UL (ref 1.8–7.7)
NEUTROPHILS NFR BLD: 55.1 % (ref 38–73)
NITRITE UR QL STRIP: NEGATIVE
NRBC BLD-RTO: 0 /100 WBC
PH UR STRIP: 5 [PH] (ref 5–8)
PLATELET # BLD AUTO: 238 K/UL (ref 150–450)
PMV BLD AUTO: 11.5 FL (ref 9.2–12.9)
POCT GLUCOSE: 296 MG/DL (ref 70–110)
POCT GLUCOSE: 301 MG/DL (ref 70–110)
POCT GLUCOSE: 365 MG/DL (ref 70–110)
POTASSIUM SERPL-SCNC: 4.3 MMOL/L (ref 3.5–5.1)
PROT SERPL-MCNC: 7.4 G/DL (ref 6–8.4)
PROT UR QL STRIP: NEGATIVE
RBC # BLD AUTO: 5.51 M/UL (ref 4.6–6.2)
RBC #/AREA URNS HPF: 2 /HPF (ref 0–4)
SODIUM SERPL-SCNC: 133 MMOL/L (ref 136–145)
SP GR UR STRIP: 1.02 (ref 1–1.03)
SQUAMOUS #/AREA URNS HPF: 4 /HPF
URN SPEC COLLECT METH UR: ABNORMAL
UROBILINOGEN UR STRIP-ACNC: NEGATIVE EU/DL
WBC # BLD AUTO: 8.32 K/UL (ref 3.9–12.7)
WBC #/AREA URNS HPF: 0 /HPF (ref 0–5)
YEAST URNS QL MICRO: NORMAL

## 2021-04-19 PROCEDURE — 80053 COMPREHEN METABOLIC PANEL: CPT | Performed by: PHYSICIAN ASSISTANT

## 2021-04-19 PROCEDURE — 81000 URINALYSIS NONAUTO W/SCOPE: CPT | Performed by: PHYSICIAN ASSISTANT

## 2021-04-19 PROCEDURE — 96361 HYDRATE IV INFUSION ADD-ON: CPT

## 2021-04-19 PROCEDURE — 85025 COMPLETE CBC W/AUTO DIFF WBC: CPT | Performed by: PHYSICIAN ASSISTANT

## 2021-04-19 PROCEDURE — 96374 THER/PROPH/DIAG INJ IV PUSH: CPT

## 2021-04-19 PROCEDURE — 63600175 PHARM REV CODE 636 W HCPCS: Performed by: EMERGENCY MEDICINE

## 2021-04-19 PROCEDURE — 82962 GLUCOSE BLOOD TEST: CPT

## 2021-04-19 PROCEDURE — 63700000 PHARM REV CODE 250 ALT 637 W/O HCPCS: Performed by: EMERGENCY MEDICINE

## 2021-04-19 PROCEDURE — 86803 HEPATITIS C AB TEST: CPT | Performed by: EMERGENCY MEDICINE

## 2021-04-19 PROCEDURE — 86703 HIV-1/HIV-2 1 RESULT ANTBDY: CPT | Performed by: EMERGENCY MEDICINE

## 2021-04-19 PROCEDURE — 25000003 PHARM REV CODE 250: Performed by: PHYSICIAN ASSISTANT

## 2021-04-19 PROCEDURE — 82010 KETONE BODYS QUAN: CPT | Performed by: NURSE PRACTITIONER

## 2021-04-19 PROCEDURE — 99284 EMERGENCY DEPT VISIT MOD MDM: CPT | Mod: 25

## 2021-04-19 RX ORDER — FLUCONAZOLE 100 MG/1
100 TABLET ORAL
Status: COMPLETED | OUTPATIENT
Start: 2021-04-19 | End: 2021-04-19

## 2021-04-19 RX ORDER — DOXYLAMINE SUCCINATE 25 MG
TABLET ORAL 2 TIMES DAILY
Qty: 30 G | Refills: 3 | Status: SHIPPED | OUTPATIENT
Start: 2021-04-19

## 2021-04-19 RX ADMIN — SODIUM CHLORIDE 1000 ML: 0.9 INJECTION, SOLUTION INTRAVENOUS at 12:04

## 2021-04-19 RX ADMIN — INSULIN HUMAN 3 UNITS: 100 INJECTION, SOLUTION PARENTERAL at 01:04

## 2021-04-19 RX ADMIN — FLUCONAZOLE 100 MG: 100 TABLET ORAL at 12:04

## 2021-10-11 ENCOUNTER — HOSPITAL ENCOUNTER (EMERGENCY)
Facility: OTHER | Age: 46
Discharge: HOME | End: 2021-10-11
Attending: EMERGENCY MEDICINE
Payer: MEDICAID

## 2021-10-11 VITALS
TEMPERATURE: 98 F | DIASTOLIC BLOOD PRESSURE: 91 MMHG | OXYGEN SATURATION: 97 % | HEIGHT: 72 IN | RESPIRATION RATE: 19 BRPM | WEIGHT: 270 LBS | SYSTOLIC BLOOD PRESSURE: 149 MMHG | HEART RATE: 79 BPM | BODY MASS INDEX: 36.57 KG/M2

## 2021-10-11 DIAGNOSIS — N48.89 FORESKIN FISSURE: ICD-10-CM

## 2021-10-11 DIAGNOSIS — N48.1 BALANITIS: Primary | ICD-10-CM

## 2021-10-11 PROCEDURE — 99283 EMERGENCY DEPT VISIT LOW MDM: CPT

## 2021-10-11 RX ORDER — DOXYLAMINE SUCCINATE 25 MG
TABLET ORAL 2 TIMES DAILY
Qty: 30 G | Refills: 0 | Status: SHIPPED | OUTPATIENT
Start: 2021-10-11

## 2021-10-12 ENCOUNTER — NURSE TRIAGE (OUTPATIENT)
Dept: ADMINISTRATIVE | Facility: CLINIC | Age: 46
End: 2021-10-12

## 2022-04-10 ENCOUNTER — HOSPITAL ENCOUNTER (EMERGENCY)
Facility: OTHER | Age: 47
Discharge: HOME | End: 2022-04-10
Attending: EMERGENCY MEDICINE
Payer: MEDICAID

## 2022-04-10 VITALS
SYSTOLIC BLOOD PRESSURE: 163 MMHG | WEIGHT: 273 LBS | BODY MASS INDEX: 36.98 KG/M2 | TEMPERATURE: 98 F | OXYGEN SATURATION: 96 % | RESPIRATION RATE: 20 BRPM | HEART RATE: 79 BPM | DIASTOLIC BLOOD PRESSURE: 83 MMHG | HEIGHT: 72 IN

## 2022-04-10 DIAGNOSIS — J02.0 STREP PHARYNGITIS: Primary | ICD-10-CM

## 2022-04-10 LAB — GROUP A STREP, MOLECULAR: POSITIVE

## 2022-04-10 PROCEDURE — 25000003 PHARM REV CODE 250: Performed by: EMERGENCY MEDICINE

## 2022-04-10 PROCEDURE — 63600175 PHARM REV CODE 636 W HCPCS: Mod: JG | Performed by: EMERGENCY MEDICINE

## 2022-04-10 PROCEDURE — 87651 STREP A DNA AMP PROBE: CPT | Performed by: EMERGENCY MEDICINE

## 2022-04-10 PROCEDURE — 96372 THER/PROPH/DIAG INJ SC/IM: CPT | Performed by: EMERGENCY MEDICINE

## 2022-04-10 PROCEDURE — 99284 EMERGENCY DEPT VISIT MOD MDM: CPT | Mod: 25

## 2022-04-10 RX ORDER — IBUPROFEN 600 MG/1
600 TABLET ORAL EVERY 6 HOURS PRN
Qty: 20 TABLET | Refills: 0 | Status: SHIPPED | OUTPATIENT
Start: 2022-04-10

## 2022-04-10 RX ORDER — KETOROLAC TROMETHAMINE 30 MG/ML
15 INJECTION, SOLUTION INTRAMUSCULAR; INTRAVENOUS
Status: COMPLETED | OUTPATIENT
Start: 2022-04-10 | End: 2022-04-10

## 2022-04-10 RX ORDER — MAG HYDROX/ALUMINUM HYD/SIMETH 200-200-20
30 SUSPENSION, ORAL (FINAL DOSE FORM) ORAL ONCE
Status: COMPLETED | OUTPATIENT
Start: 2022-04-10 | End: 2022-04-10

## 2022-04-10 RX ORDER — LIDOCAINE HYDROCHLORIDE 20 MG/ML
10 SOLUTION OROPHARYNGEAL ONCE
Status: COMPLETED | OUTPATIENT
Start: 2022-04-10 | End: 2022-04-10

## 2022-04-10 RX ADMIN — PENICILLIN G BENZATHINE 1.2 MILLION UNITS: 1200000 INJECTION, SUSPENSION INTRAMUSCULAR at 05:04

## 2022-04-10 RX ADMIN — KETOROLAC TROMETHAMINE 15 MG: 30 INJECTION, SOLUTION INTRAMUSCULAR at 04:04

## 2022-04-10 RX ADMIN — ALUMINUM HYDROXIDE, MAGNESIUM HYDROXIDE, AND SIMETHICONE 30 ML: 200; 200; 20 SUSPENSION ORAL at 04:04

## 2022-04-10 RX ADMIN — LIDOCAINE HYDROCHLORIDE 10 ML: 20 SOLUTION ORAL; TOPICAL at 04:04

## 2022-04-10 NOTE — ED TRIAGE NOTES
Patient presents c/o sore throat that started on Friday night; took TheraFLu with no relief. Denies difficulty swallowing. AAOx4.

## 2022-04-10 NOTE — ED PROVIDER NOTES
Encounter Date: 4/10/2022    SCRIBE #1 NOTE: I, Jason Kenji III, am scribing for, and in the presence of, Nick Laureano MD .       History     Chief Complaint   Patient presents with    Sore Throat     Reports sore throat pain (R>L) for several days, painful to swallow. Tonsil swelling noted, no visual exudate. Denies any ear pain/nasal issues. Slightly muffled voice, no drooling     Mo Orozco is a 46 y.o. male, with a PMHx of HTN and DM, who presents to the ED with complaint of sore throat that began 3 days ago PTA. Patient has difficulty swallowing secondary to pain. This evening when he got home, his sore throat was worse. Patient reports he had a mild subjective fever that only lasted for 24 hours when his symptoms first began. He reports no changes to his appetite. He denies rhinorrhea or cough. He had some Theraflu last night with minimal relief. He took his blood sugar before coming to the ED and it was 132. No other exacerbating or alleviating factors. Denies any other associated symptoms.     The history is provided by the patient.     Review of patient's allergies indicates:  No Known Allergies  Past Medical History:   Diagnosis Date    Diabetes mellitus     Hypertension      Past Surgical History:   Procedure Laterality Date    HERNIA REPAIR       No family history on file.  Social History     Tobacco Use    Smoking status: Current Every Day Smoker     Packs/day: 0.50     Types: Cigarettes    Smokeless tobacco: Never Used   Substance Use Topics    Alcohol use: Yes    Drug use: Yes     Types: Marijuana     Review of Systems   Constitutional: Negative for fever.   HENT: Positive for sore throat and trouble swallowing. Negative for congestion and rhinorrhea.    Eyes: Negative for redness.   Respiratory: Negative for cough and shortness of breath.    Cardiovascular: Negative for chest pain.   Gastrointestinal: Negative for abdominal pain.   Genitourinary: Negative for dysuria.   Skin:  Negative for rash.   Neurological: Negative for headaches.   Psychiatric/Behavioral: Negative for confusion.       Physical Exam     Initial Vitals [04/10/22 0345]   BP Pulse Resp Temp SpO2   (!) 159/96 91 20 98.3 °F (36.8 °C) 97 %      MAP       --         Physical Exam    Nursing note and vitals reviewed.  Constitutional: He appears well-developed and well-nourished. He is not diaphoretic. No distress.   HENT:   Head: Normocephalic and atraumatic.   Mouth/Throat: No oropharyngeal exudate.   Mild posterior pharynx erythema. No exudate or peritonsillar abscess   Eyes: Conjunctivae and EOM are normal.   Neck: Neck supple.   Normal range of motion.  Cardiovascular: Normal rate, regular rhythm, normal heart sounds and intact distal pulses.   No murmur heard.  Pulmonary/Chest: Breath sounds normal. He has no wheezes. He has no rhonchi. He has no rales.   Musculoskeletal:         General: No edema.      Cervical back: Normal range of motion and neck supple.     Lymphadenopathy:     He has no cervical adenopathy.   Neurological: He is alert and oriented to person, place, and time.   Skin: Skin is warm and dry.   Psychiatric: He has a normal mood and affect.         ED Course   Procedures  Labs Reviewed   GROUP A STREP, MOLECULAR - Abnormal; Notable for the following components:       Result Value    Group A Strep, Molecular Positive (*)     All other components within normal limits          Imaging Results    None          Medications   penicillin G benzathine (BICILLIN LA) injection 1.2 Million Units (has no administration in time range)   ketorolac injection 15 mg (15 mg Intramuscular Given 4/10/22 0434)   aluminum-magnesium hydroxide-simethicone 200-200-20 mg/5 mL suspension 30 mL (30 mLs Oral Given 4/10/22 0434)     And   LIDOcaine HCl 2% oral solution 10 mL (10 mLs Oral Given 4/10/22 0434)     Medical Decision Making:   History:   Old Medical Records: I decided to obtain old medical records.  Initial Assessment:        46-year-old male with history of HTN/DM presents for evaluation of sore throat for the past 3 days.  He reports possible subjective fever at onset, denies any other URI symptoms. Pain with swallowing worsened since onset, though still able to swallow food and liquids without difficulty.  On arrival patient afebrile and well-appearing, with posterior pharynx erythema but no exudate or peritonsillar abscess.  Triage mentioned slightly muffled voice but this was not present on my evaluation, no difficulty swallowing secretions.  Differential included strep versus viral pharyngitis.  On reassessment after IM Toradol and viscous lidocaine, patient feels better and sleeping comfortably.  Rapid strep test was positive. Patient was offered IM penicillin versus p.o. antibiotics and referred IM treatment now.  He is advised on further supportive care including NSAIDs and topical anesthetics for home, and ED return precautions for any worsening symptoms or other concerns.      Clinical Tests:   Lab Tests: Ordered and Reviewed          Scribe Attestation:   Scribe #1: I performed the above scribed service and the documentation accurately describes the services I performed. I attest to the accuracy of the note.                I, Dr. Nick Laureano, personally performed the services described in this documentation. All medical record entries made by the scribe were at my direction and in my presence.  I have reviewed the chart and agree that the record reflects my personal performance and is accurate and complete. Nick Laureano MD.         Clinical Impression:   Final diagnoses:  [J02.0] Strep pharyngitis (Primary)          ED Disposition Condition    Discharge Stable        ED Prescriptions     Medication Sig Dispense Start Date End Date Auth. Provider    ibuprofen (ADVIL,MOTRIN) 600 MG tablet Take 1 tablet (600 mg total) by mouth every 6 (six) hours as needed for Pain. 20 tablet 4/10/2022  Nick Laureano MD         Follow-up Information     Follow up With Specialties Details Why Contact Info    [facility] - Emergency Dept Emergency Medicine Go to  If symptoms worsen [id number] [facility] [id number] [phone]           Nick Laureano MD  04/10/22 7097

## 2022-12-29 PROCEDURE — 87635 SARS-COV-2 COVID-19 AMP PRB: CPT | Performed by: EMERGENCY MEDICINE

## 2022-12-29 PROCEDURE — 99283 EMERGENCY DEPT VISIT LOW MDM: CPT | Mod: 25

## 2022-12-29 RX ORDER — ACETAMINOPHEN 500 MG
1000 TABLET ORAL
Status: COMPLETED | OUTPATIENT
Start: 2022-12-30 | End: 2022-12-30

## 2022-12-30 ENCOUNTER — HOSPITAL ENCOUNTER (EMERGENCY)
Facility: OTHER | Age: 47
Discharge: HOME | End: 2022-12-30
Attending: EMERGENCY MEDICINE
Payer: MEDICAID

## 2022-12-30 VITALS
DIASTOLIC BLOOD PRESSURE: 96 MMHG | OXYGEN SATURATION: 94 % | BODY MASS INDEX: 38.6 KG/M2 | WEIGHT: 285 LBS | TEMPERATURE: 99 F | HEART RATE: 100 BPM | SYSTOLIC BLOOD PRESSURE: 177 MMHG | HEIGHT: 72 IN | RESPIRATION RATE: 17 BRPM

## 2022-12-30 DIAGNOSIS — J10.1 INFLUENZA A: Primary | ICD-10-CM

## 2022-12-30 DIAGNOSIS — R05.9 COUGH: ICD-10-CM

## 2022-12-30 LAB
CTP QC/QA: YES
CTP QC/QA: YES
POC MOLECULAR INFLUENZA A AGN: POSITIVE
POC MOLECULAR INFLUENZA B AGN: NEGATIVE
SARS-COV-2 RDRP RESP QL NAA+PROBE: NEGATIVE

## 2022-12-30 PROCEDURE — 25000003 PHARM REV CODE 250: Performed by: EMERGENCY MEDICINE

## 2022-12-30 RX ADMIN — ACETAMINOPHEN 1000 MG: 500 TABLET, FILM COATED ORAL at 12:12

## 2022-12-30 NOTE — ED PROVIDER NOTES
Encounter Date: 12/29/2022    SCRIBE #1 NOTE: I, Hubert Geo, am scribing for, and in the presence of,  Nicole Batres MD.     History     Chief Complaint   Patient presents with    Cough     X 2 days      Time seen by provider: 12:50 AM    This is a 47 y.o. male with PMHx of DM who presents with complaint of fatigue and cough for the past 3 days. The patient also reports a subjective fever though he did not record a temperature. He also notes that he became diaphoretic a few nights ago, which led him to take Theraflu which provided relief. He also reports diarrhea but he denies vomiting and any trouble swallowing. He denies any sick contacts. This is the extent of the patient's complaints at this time.    The history is provided by the patient.   Review of patient's allergies indicates:  No Known Allergies  Past Medical History:   Diagnosis Date    Diabetes mellitus     Hypertension      Past Surgical History:   Procedure Laterality Date    HERNIA REPAIR       No family history on file.  Social History     Tobacco Use    Smoking status: Every Day     Packs/day: 0.50     Types: Cigarettes    Smokeless tobacco: Never   Substance Use Topics    Alcohol use: Yes    Drug use: Yes     Types: Marijuana     Review of Systems   Constitutional:  Positive for diaphoresis, fatigue and fever. Negative for activity change, appetite change and chills.   HENT:  Negative for congestion, sore throat and trouble swallowing.    Eyes:  Negative for photophobia and visual disturbance.   Respiratory:  Positive for cough. Negative for chest tightness and shortness of breath.    Cardiovascular:  Negative for chest pain.   Gastrointestinal:  Negative for abdominal pain, nausea and vomiting.   Endocrine: Negative for polydipsia, polyphagia and polyuria.   Genitourinary:  Negative for difficulty urinating and flank pain.   Musculoskeletal:  Negative for back pain and neck pain.   Skin:  Negative for pallor.   Neurological:  Negative for  weakness and headaches.   Psychiatric/Behavioral:  Negative for confusion.    All other systems reviewed and are negative.    Physical Exam     Initial Vitals [12/29/22 2339]   BP Pulse Resp Temp SpO2   (!) 142/86 102 20 (!) 100.5 °F (38.1 °C) (!) 93 %      MAP       --         Physical Exam    Nursing note and vitals reviewed.  Constitutional: He appears well-developed and well-nourished. He does not have a sickly appearance. No distress.   HENT:   Head: Normocephalic and atraumatic.   Right Ear: External ear normal.   Left Ear: External ear normal.   Eyes: Conjunctivae, EOM and lids are normal. Right eye exhibits no discharge. Left eye exhibits no discharge. Right conjunctiva is not injected. Right conjunctiva has no hemorrhage. Left conjunctiva is not injected. Left conjunctiva has no hemorrhage. No scleral icterus.   Neck: Phonation normal. No stridor present. No tracheal deviation present.   Normal range of motion.  Cardiovascular:  Normal rate, regular rhythm and normal heart sounds.     Exam reveals no friction rub.       No murmur heard.  Pulmonary/Chest: Breath sounds normal. No respiratory distress. He has no wheezes. He has no rhonchi. He has no rales.   Abdominal: Abdomen is soft. He exhibits no distension. There is no abdominal tenderness.   Musculoskeletal:         General: No tenderness. Normal range of motion.      Cervical back: Normal range of motion.     Neurological: He is alert and oriented to person, place, and time. He has normal strength. GCS eye subscore is 4. GCS verbal subscore is 5. GCS motor subscore is 6.   Skin: Skin is warm.   Psychiatric: He has a normal mood and affect. His speech is normal and behavior is normal. Judgment and thought content normal. Cognition and memory are normal.       ED Course   Procedures  Labs Reviewed   POCT INFLUENZA A/B MOLECULAR - Abnormal; Notable for the following components:       Result Value    POC Molecular Influenza A Ag Positive (*)     All other  components within normal limits   SARS-COV-2 RDRP GENE          Imaging Results              X-Ray Chest AP Portable (Final result)  Result time 12/30/22 01:01:13      Final result by Rola Brooke MD (12/30/22 01:01:13)                   Impression:      No acute cardiopulmonary process identified.      Electronically signed by: Rola Brooke MD  Date:    12/30/2022  Time:    01:01               Narrative:    EXAMINATION:  XR CHEST AP PORTABLE    CLINICAL HISTORY:  Cough, unspecified    TECHNIQUE:  Single frontal view of the chest was performed.    COMPARISON:  October 2019.    FINDINGS:  Cardiac silhouette is normal in size.  Lungs are symmetrically expanded.  No evidence of focal consolidative process, pneumothorax, or significant pleural effusion.  No acute osseous abnormality identified.                                    X-Rays:   Independently Interpreted Readings:   Chest X-Ray: No large effusion or consolidation.    Medications   acetaminophen tablet 1,000 mg (1,000 mg Oral Given 12/30/22 0034)     Medical Decision Making:   History:   Old Medical Records: I decided to obtain old medical records.  Independently Interpreted Test(s):   I have ordered and independently interpreted X-rays - see prior notes.  Clinical Tests:   Lab Tests: Reviewed and Ordered  Radiological Study: Ordered and Reviewed  Additional MDM:   Comments: 47-year-old male presents febrile, mildly tachycardic but otherwise well-appearing with an unremarkable exam.  He presents with multiple symptoms consistent with a viral illness.  COVID was negative but he is positive for influenza A.  His symptoms have been present for over 48 hours, therefore, Tamiflu was not offered.  He was counseled supportive care for home and discharged in stable condition with indications for seeking re-evaluation..      Scribe Attestation:   Scribe #1: I performed the above scribed service and the documentation accurately describes the services I performed.  I attest to the accuracy of the note.          Physician Attestation for Scribe: I, Nicole Batres  , reviewed documentation as scribed in my presence, which is both accurate and complete.           Clinical Impression:   Final diagnoses:  [R05.9] Cough  [J10.1] Influenza A (Primary)        ED Disposition Condition    Discharge Stable          ED Prescriptions    None       Follow-up Information       Follow up With Specialties Details Why Contact Info    primary care  Schedule an appointment as soon as possible for a visit                Nicole Batres MD  12/30/22 [id number]

## 2025-01-12 ENCOUNTER — HOSPITAL ENCOUNTER (EMERGENCY)
Facility: OTHER | Age: 50
Discharge: HOME | End: 2025-01-12
Attending: EMERGENCY MEDICINE
Payer: MEDICAID

## 2025-01-12 VITALS
SYSTOLIC BLOOD PRESSURE: 175 MMHG | TEMPERATURE: 98 F | WEIGHT: 285 LBS | HEART RATE: 78 BPM | HEIGHT: 72 IN | OXYGEN SATURATION: 95 % | RESPIRATION RATE: 18 BRPM | DIASTOLIC BLOOD PRESSURE: 79 MMHG | BODY MASS INDEX: 38.6 KG/M2

## 2025-01-12 DIAGNOSIS — Z02.89 ENCOUNTER TO OBTAIN EXCUSE FROM WORK: Primary | ICD-10-CM

## 2025-01-12 DIAGNOSIS — R73.9 HYPERGLYCEMIA: ICD-10-CM

## 2025-01-12 LAB — POCT GLUCOSE: 195 MG/DL (ref 70–110)

## 2025-01-12 PROCEDURE — 82962 GLUCOSE BLOOD TEST: CPT

## 2025-01-12 PROCEDURE — 99282 EMERGENCY DEPT VISIT SF MDM: CPT

## 2025-01-12 NOTE — ED PROVIDER NOTES
Source of History:  Patient, chart    Chief complaint:  Letter for School/Work (Needs a work note saying he can go back to work with hyperglycemia.  on arrival.)      HPI:  Mo Orozco is a 49 y.o. male with medical history of DM, HTN presenting with work note.  Patient states that he left work early today due to not feeling well and told them that he thought that his sugar was high.  Patient states he was told he needed a work note to return.  Patient denies any complaints at this time.  Glucose in the ED of 195.  No increased urination, frequency or urgency    This is the extent to the patients complaints today here in the emergency department.    ROS: As per HPI and below:  Constitutional: No fever.  No chills.  Eyes: No visual changes.   ENT: No sore throat. No ear pain.  Urinary: No abnormal urination.  MSK: No back pain. No joint pain.   Integument: No rashes or lesions.    Review of patient's allergies indicates:  No Known Allergies    PMH:  As per HPI and below:  Past Medical History:   Diagnosis Date    Diabetes mellitus     Hypertension      Past Surgical History:   Procedure Laterality Date    HERNIA REPAIR         Social History     Tobacco Use    Smoking status: Every Day     Current packs/day: 0.50     Types: Cigarettes    Smokeless tobacco: Never   Substance Use Topics    Alcohol use: Yes    Drug use: Yes     Types: Marijuana       Physical Exam:    BP (!) 175/79   Pulse 78   Temp 97.8 °F (36.6 °C) (Oral)   Resp 18   Ht 6' (1.829 m)   Wt 129.3 kg (285 lb)   SpO2 95%   BMI 38.65 kg/m²   Nursing note and vital signs reviewed.  Constitutional: No acute distress.  Nontoxic  Eyes: No conjunctival injection. Extraocular muscles intact.  ENT: Normal phonation.  Musculoskeletal: Good range of motion all joints.  No deformities.  Neck supple.  No meningismus. Neurovascularly intact.  Skin: No rashes seen.  Good turgor.  No abrasions.  No ecchymoses.  Psych:  Alert and oriented x 3.  Appropriate,  conversant.    [facility]    Emergent evaluation of a 47 yo may presenting for work note.  Patient states he left work earlier today due to not feeling well and was told he needed a work note.  Patient denies any complaints at this time.  On exam pt is A&Ox3. VSS. Nonfebrile and nontoxic appearing.  Mucous membranes pink and moist. T Pt speaking in full sentences.  Steady gait appreciated. Cap refill < 3 seconds.      History Acquisition   Additional history was acquired from other historians.  Chart    The patient's list of active medical problems, social history, medications, and allergies as documented per RN staff has been reviewed.     Differential Diagnoses   Based on available information and the initial assessment, the working differential diagnoses considered during this evaluation include but are not limited to hyperglycemia, medical excuse, work note, other.      LABS     Labs Reviewed   POCT GLUCOSE - Abnormal       Result Value    POCT Glucose 195 (*)    POCT GLUCOSE MONITORING CONTINUOUS     Additional Consideration   All available testing was considered during the course of this workup.  Comorbidities taken into consideration during the patient's evaluation and treatment include weight, age.    Social determinants of health were taken into consideration during development of our treatment plan.    Medications - No data to display            Patient given work note to return tomorrow.  Advised to increase fluids.  Monitor her glucose closely.  Strict return to ED precautions discussed.  Patient verbalized understanding of this plan of care.  All questions and concerns addressed    Patient is hemodynamically stable, vital signs are normal. Discharge instructions given. Return to ED precautions discussed. Follow up as directed. Pt verbalized understanding of this plan.  Pt is stable for discharge.     CLINICAL IMPRESSION  1. Encounter to obtain excuse from work    2. Hyperglycemia         ED Disposition  Condition    Discharge Stable            Instruction:  I see no indication of an emergent process beyond that addressed during our encounter but have duly counseled the patient/family regarding the need for prompt follow-up as well as the indications that should prompt immediate return to the emergency room should new or worrisome developments occur.  The patient/family has been provided with verbal and printed direction regarding our final diagnosis(es) as well as instructions regarding use of OTC and/or Rx medications intended to manage the patient's aforementioned conditions including:  ED Prescriptions    None       Patient has been advised of following recommended follow-up instructions:  Follow-up Information       Follow up With Specialties Details Why Contact Info    PCP  Schedule an appointment as soon as possible for a visit  As needed           The patient/family communicates understanding of all this information and all remaining questions and concerns were addressed at this time.      The patient's condition did not warrant review of the  and prescription of controlled substances.      This note was created using dictation software.  This program may occasionally mistype words and phrases.         Mira Broderick NP  01/12/25 [id number]

## 2025-01-12 NOTE — Clinical Note
"Mo"Shawn Orozco was seen and treated in our emergency department on 1/12/2025.  He may return to work on 01/12/2025.       If you have any questions or concerns, please don't hesitate to call.      Mira Broderick, NP"

## 2025-04-14 DIAGNOSIS — M25.512 LEFT SHOULDER PAIN: Primary | ICD-10-CM
